# Patient Record
Sex: MALE | Race: WHITE | URBAN - METROPOLITAN AREA
[De-identification: names, ages, dates, MRNs, and addresses within clinical notes are randomized per-mention and may not be internally consistent; named-entity substitution may affect disease eponyms.]

---

## 2017-01-01 ENCOUNTER — OFFICE VISIT (OUTPATIENT)
Dept: URGENT CARE | Facility: URGENT CARE | Age: 22
End: 2017-01-01
Payer: COMMERCIAL

## 2017-01-01 ENCOUNTER — TELEPHONE (OUTPATIENT)
Dept: NURSING | Facility: CLINIC | Age: 22
End: 2017-01-01

## 2017-01-01 ENCOUNTER — RADIANT APPOINTMENT (OUTPATIENT)
Dept: GENERAL RADIOLOGY | Facility: CLINIC | Age: 22
End: 2017-01-01
Attending: PHYSICIAN ASSISTANT
Payer: COMMERCIAL

## 2017-01-01 VITALS
DIASTOLIC BLOOD PRESSURE: 60 MMHG | WEIGHT: 157.4 LBS | OXYGEN SATURATION: 100 % | HEART RATE: 78 BPM | TEMPERATURE: 98 F | SYSTOLIC BLOOD PRESSURE: 108 MMHG | BODY MASS INDEX: 21.35 KG/M2

## 2017-01-01 DIAGNOSIS — J00 ACUTE RHINITIS: ICD-10-CM

## 2017-01-01 DIAGNOSIS — R05.9 COUGH: ICD-10-CM

## 2017-01-01 DIAGNOSIS — J34.3 NASAL TURBINATE HYPERTROPHY: ICD-10-CM

## 2017-01-01 DIAGNOSIS — R05.9 COUGH: Primary | ICD-10-CM

## 2017-01-01 PROCEDURE — 99214 OFFICE O/P EST MOD 30 MIN: CPT | Performed by: PHYSICIAN ASSISTANT

## 2017-01-01 PROCEDURE — 71020 XR CHEST 2 VW: CPT

## 2017-01-01 RX ORDER — FLUTICASONE PROPIONATE 50 MCG
2 SPRAY, SUSPENSION (ML) NASAL DAILY
Qty: 16 G | Refills: 1 | Status: SHIPPED | OUTPATIENT
Start: 2017-01-01

## 2017-01-01 RX ORDER — DEXTROMETHORPHAN POLISTIREX 30 MG/5ML
60 SUSPENSION ORAL 2 TIMES DAILY
Qty: 148 ML | Refills: 0 | Status: SHIPPED | OUTPATIENT
Start: 2017-01-01

## 2017-01-01 RX ORDER — LORATADINE 10 MG/1
10 TABLET ORAL DAILY
Qty: 30 TABLET | Refills: 1 | Status: SHIPPED | OUTPATIENT
Start: 2017-01-01

## 2017-02-04 NOTE — TELEPHONE ENCOUNTER
Call Type: Triage Call    Presenting Problem:  calls and wants information about  patient who is currently incarcerated and complaining of abdominal  pain.  Advised that I am unable to release medical information on a  patient, and that if he is complaining of abdominal pain even after  taking his medications, I would recommend evaluation by a nurse or  clinician.  Triage Note:  Guideline Title: Information Only Call; No Symptom Triage (Adult)  Recommended Disposition: Caller to Return Call within 30 min  Original Inclination: Wanted to speak with a nurse  Override Disposition:  Intended Action: Go to Hospital / ED  Physician Contacted: No  Caller not with patient and is unable to provide clinical information about  patient to facilitate triage. ?  YES  Requesting regular office appointment ? NO  Sign(s) or symptom(s) associated with a diagnosed condition or with a new illness  ? NO  Requesting information about provider, services or community resources ? NO  Call back to complete assessment/clarification of information from prior caller to  complete triage ? NO  Requesting information and provider is best resource; no triage required. ? NO  Requesting provider information for recently scheduled test, procedure; no triage  required. Needed information not available per approved resources or clinical  experience. ? NO  Requesting information not available per approved reference or clinical  experience; no triage required. ? NO  Physician Instructions:  Care Advice:

## 2017-05-21 NOTE — NURSING NOTE
Chief Complaint   Patient presents with     Cough     chest pain, chest congestion, shortness of breath, 3x days        Initial /60 (BP Location: Left arm, Patient Position: Chair, Cuff Size: Adult Regular)  Pulse 78  Temp 98  F (36.7  C) (Oral)  Wt 157 lb 6.4 oz (71.4 kg)  SpO2 100%  BMI 21.35 kg/m2 Estimated body mass index is 21.35 kg/(m^2) as calculated from the following:    Height as of 7/18/16: 6' (1.829 m).    Weight as of this encounter: 157 lb 6.4 oz (71.4 kg).  Medication Reconciliation: complete

## 2017-05-21 NOTE — MR AVS SNAPSHOT
After Visit Summary   5/21/2017    Amadeo Cunningham    MRN: [id number]           Patient Information     Date Of Birth          1995        Visit Information        Provider Department      5/21/2017 9:20 AM An Jensen PA-C [facility]        Today's Diagnoses     Cough    -  1    Nasal turbinate hypertrophy        Acute rhinitis          Care Instructions    (R05) Cough  (primary encounter diagnosis)  Comment: negative chest xray preliminary reading  Plan: XR Chest 2 Views, dextromethorphan (DELSYM) 30         MG/5ML liquid        Cough likely secondary to post nasal drip    (J34.3) Nasal turbinate hypertrophy  Comment:   Plan: fluticasone (FLONASE) 50 MCG/ACT spray            (J00) Acute rhinitis  Comment:   Plan: loratadine (CLARITIN) 10 MG tablet            Follow up with primary physician should symptoms worsen or persist more than another 7-10 days.          Follow-ups after your visit        Who to contact     If you have questions or need follow up information about today's clinic visit or your schedule please contact [facility] directly at [phone].  Normal or non-critical lab and imaging results will be communicated to you by [employer], letter or phone within 4 business days after the clinic has received the results. If you do not hear from us within 7 days, please contact the clinic through [employer] or phone. If you have a critical or abnormal lab result, we will notify you by phone as soon as possible.  Submit refill requests through [employer] or call your pharmacy and they will forward the refill request to us. Please allow 3 business days for your refill to be completed.          Additional Information About Your Visit        MyChart Information     [employer] lets you send messages to your doctor, view your test results, renew your prescriptions, schedule appointments and more. To sign up, go to  [email or web address] . Click on \"Log in\" on the left side of the screen, which will take you to the Welcome page. Then click on \"Sign up Now\" on the right side of the page.     You will be asked to enter the access code listed below, as well as some personal information. Please follow the directions to create your username and password.     Your access code is: [email or web address]  Expires: 2017 10:05 AM     Your access code will  in 90 days. If you need help or a new code, please call your [facility] clinic or [phone].        Care EveryWhere ID     This is your Care EveryWhere ID. This could be used by other organizations to access your [facility] medical records  [id number]        Your Vitals Were     Pulse Temperature Pulse Oximetry BMI (Body Mass Index)          78 98  F (36.7  C) (Oral) 100% 21.35 kg/m2         Blood Pressure from Last 3 Encounters:   17 108/60   16 (!) 144/97   16 105/53    Weight from Last 3 Encounters:   17 157 lb 6.4 oz (71.4 kg)   16 150 lb (68 kg)   16 165 lb (74.8 kg)                 Today's Medication Changes          These changes are accurate as of: 17 10:05 AM.  If you have any questions, ask your nurse or doctor.               Start taking these medicines.        Dose/Directions    dextromethorphan 30 MG/5ML liquid   Commonly known as:  DELSYM   Used for:  Cough   Started by:  An Jensen PA-C        Dose:  60 mg   Take 10 mLs (60 mg) by mouth 2 times daily   Quantity:  148 mL   Refills:  0       fluticasone 50 MCG/ACT spray   Commonly known as:  FLONASE   Used for:  Nasal turbinate hypertrophy   Started by:  An Jensen PA-C        Dose:  2 spray   Spray 2 sprays into both nostrils daily   Quantity:  16 g   Refills:  1       loratadine 10 MG tablet   Commonly known as:  CLARITIN   Used for:  Acute rhinitis   Started by:  An Jensen PA-C        Dose:  10 mg   Take 1 tablet (10 mg) by " mouth daily   Quantity:  30 tablet   Refills:  1            Where to get your medicines      These medications were sent to [id number] - [place], [place] - [address] AT [facility] [address], [facility] [address]    Hours:  24-hours Phone:  [phone]     dextromethorphan 30 MG/5ML liquid    fluticasone 50 MCG/ACT spray    loratadine 10 MG tablet                Primary Care Provider Office Phone # Fax #    Cale Echols [phone]       [facility] [address] [facility] [address] [address]        Thank you!     Thank you for choosing [facility]  for your care. Our goal is always to provide you with excellent care. Hearing back from our patients is one way we can continue to improve our services. Please take a few minutes to complete the written survey that you may receive in the mail after your visit with us. Thank you!             Your Updated Medication List - Protect others around you: Learn how to safely use, store and throw away your medicines at www.disposemymeds.org.          This list is accurate as of: 5/21/17 10:05 AM.  Always use your most recent med list.                   Brand Name Dispense Instructions for use    ADDERALL PO          dextromethorphan 30 MG/5ML liquid    DELSYM    148 mL    Take 10 mLs (60 mg) by mouth 2 times daily       fluticasone 50 MCG/ACT spray    FLONASE    16 g    Spray 2 sprays into both nostrils daily       HYDROcodone-acetaminophen 5-325 MG per tablet    NORCO    20 tablet    Take 1-2 tablets by mouth every 4 hours as needed       loratadine 10 MG tablet    CLARITIN    30 tablet    Take 1 tablet (10 mg) by mouth daily       ondansetron 4 MG ODT tab    ZOFRAN ODT    10 tablet    Take 1 tablet (4 mg) by mouth every 4 hours as needed for nausea

## 2017-05-21 NOTE — PROGRESS NOTES
"SUBJECTIVE:   Amadeo Cunningham is a 22 year old male presenting with a chief complaint of   1) cough for the past 3 days.  Some wheezing and shortness of breath.  No fevers.  Complains of a \"burning\" in his chest.    Denies any runny nose or congestion, but is sniffling  Onset of symptoms was as above.  Course of illness is worsening.    Severity moderate  Current and Associated symptoms: as per HPI  Treatment measures tried include OTC meds:   Predisposing factors include: None.  DID have a flu shot this season.  Nonsmoker.    No past medical history on file.   ADHD    There is no problem list on file for this patient.    Social History   Substance Use Topics     Smoking status: Never Smoker     Smokeless tobacco: Not on file     Alcohol use No       ROS:  CONSTITUTIONAL:NEGATIVE for fever, chills, change in weight  INTEGUMENTARY/SKIN: NEGATIVE for worrisome rashes, moles or lesions  EYES: NEGATIVE for vision changes or irritation  ENT/MOUTH: as per HPI  RESP:as per HPI  CV: NEGATIVE for chest pain, palpitations or peripheral edema  GI: NEGATIVE for nausea, abdominal pain, heartburn, or change in bowel habits  NEURO: NEGATIVE for weakness, dizziness or paresthesias    OBJECTIVE  :/60 (BP Location: Left arm, Patient Position: Chair, Cuff Size: Adult Regular)  Pulse 78  Temp 98  F (36.7  C) (Oral)  Wt 157 lb 6.4 oz (71.4 kg)  SpO2 100%  BMI 21.35 kg/m2  GENERAL APPEARANCE: healthy, alert and no distress  EYES: EOMI,  PERRL, conjunctiva clear  HENT: ear canals and TM's normal.  Nose and mouth without ulcers, erythema or lesions  HENT: nasal turbinates boggy with bluish hue and rhinorrhea clear  NECK: supple, nontender, no lymphadenopathy  RESP: lungs clear to auscultation - no rales, rhonchi or wheezes  CV: regular rates and rhythm, normal S1 S2, no murmur noted  ABDOMEN:  soft, nontender, no HSM or masses and bowel sounds normal  NEURO: Normal strength and tone, sensory exam grossly normal,  normal speech and " mentation  SKIN: no suspicious lesions or rashes    (R05) Cough  (primary encounter diagnosis)  Comment: negative chest xray preliminary reading  Plan: XR Chest 2 Views, dextromethorphan (DELSYM) 30         MG/5ML liquid        Cough likely secondary to post nasal drip    (J34.3) Nasal turbinate hypertrophy  Comment:   Plan: fluticasone (FLONASE) 50 MCG/ACT spray            (J00) Acute rhinitis  Comment:   Plan: loratadine (CLARITIN) 10 MG tablet            Follow up with primary physician should symptoms worsen or persist more than another 7-10 days.

## 2017-05-21 NOTE — PATIENT INSTRUCTIONS
(R05) Cough  (primary encounter diagnosis)  Comment: negative chest xray preliminary reading  Plan: XR Chest 2 Views, dextromethorphan (DELSYM) 30         MG/5ML liquid        Cough likely secondary to post nasal drip    (J34.3) Nasal turbinate hypertrophy  Comment:   Plan: fluticasone (FLONASE) 50 MCG/ACT spray            (J00) Acute rhinitis  Comment:   Plan: loratadine (CLARITIN) 10 MG tablet            Follow up with primary physician should symptoms worsen or persist more than another 7-10 days.